# Patient Record
Sex: MALE | Race: WHITE | URBAN - METROPOLITAN AREA
[De-identification: names, ages, dates, MRNs, and addresses within clinical notes are randomized per-mention and may not be internally consistent; named-entity substitution may affect disease eponyms.]

---

## 2021-03-30 ENCOUNTER — OFFICE VISIT (OUTPATIENT)
Dept: URBAN - METROPOLITAN AREA CLINIC 50 | Facility: CLINIC | Age: 81
End: 2021-03-30

## 2021-04-08 ENCOUNTER — WEB ENCOUNTER (OUTPATIENT)
Dept: URBAN - METROPOLITAN AREA CLINIC 50 | Facility: CLINIC | Age: 81
End: 2021-04-08

## 2021-04-08 ENCOUNTER — OFFICE VISIT (OUTPATIENT)
Dept: URBAN - METROPOLITAN AREA CLINIC 50 | Facility: CLINIC | Age: 81
End: 2021-04-08
Payer: MEDICARE

## 2021-04-08 VITALS
DIASTOLIC BLOOD PRESSURE: 82 MMHG | BODY MASS INDEX: 34.56 KG/M2 | SYSTOLIC BLOOD PRESSURE: 182 MMHG | TEMPERATURE: 96.5 F | HEART RATE: 65 BPM | WEIGHT: 228 LBS | HEIGHT: 68 IN

## 2021-04-08 DIAGNOSIS — D12.6 TUBULAR ADENOMA OF COLON: ICD-10-CM

## 2021-04-08 DIAGNOSIS — Z86.010 PERSONAL HISTORY OF COLONIC POLYPS: ICD-10-CM

## 2021-04-08 DIAGNOSIS — K21.00 GASTROESOPHAGEAL REFLUX DISEASE WITH ESOPHAGITIS WITHOUT HEMORRHAGE: ICD-10-CM

## 2021-04-08 DIAGNOSIS — D64.89 OTHER SPECIFIED ANEMIAS: ICD-10-CM

## 2021-04-08 PROCEDURE — 99204 OFFICE O/P NEW MOD 45 MIN: CPT | Performed by: INTERNAL MEDICINE

## 2021-04-08 PROCEDURE — 99244 OFF/OP CNSLTJ NEW/EST MOD 40: CPT | Performed by: INTERNAL MEDICINE

## 2021-04-08 RX ORDER — FERROUS SULFATE TAB EC 325 MG (65 MG FE EQUIVALENT) 325 (65 FE) MG
1 TABLET TABLET DELAYED RESPONSE ORAL BID
Qty: 180 TABLET | Refills: 1 | OUTPATIENT

## 2021-04-08 RX ORDER — SODIUM, POTASSIUM,MAG SULFATES 17.5-3.13G
177 ML SOLUTION, RECONSTITUTED, ORAL ORAL AS DIRECTED
Qty: 1 BOX | Refills: 0 | OUTPATIENT
Start: 2021-04-08 | End: 2021-04-09

## 2021-04-08 NOTE — HPI-TODAY'S VISIT:
The patient was referred by Dr. Becker for anemia.   A copy of this document is being forwarded to the referring provider.  81 y.o. WM Beg in July, went to give blood, and, told couldn't b/c of anemia Then, again, in January, anemic too Went to PCP - told was anemic - sent here No blood in stool No abd pain No N/V Stamina isn't good - can't do anything for extended time - can't carry mulch for a long time; will have to rest in between

## 2021-04-22 PROBLEM — 428283002: Status: ACTIVE | Noted: 2021-04-08

## 2021-05-21 ENCOUNTER — OFFICE VISIT (OUTPATIENT)
Dept: URBAN - METROPOLITAN AREA MEDICAL CENTER 28 | Facility: MEDICAL CENTER | Age: 81
End: 2021-05-21
Payer: MEDICARE

## 2021-05-21 ENCOUNTER — DASHBOARD ENCOUNTERS (OUTPATIENT)
Age: 81
End: 2021-05-21

## 2021-05-21 ENCOUNTER — LAB OUTSIDE AN ENCOUNTER (OUTPATIENT)
Dept: URBAN - METROPOLITAN AREA CLINIC 96 | Facility: CLINIC | Age: 81
End: 2021-05-21

## 2021-05-21 DIAGNOSIS — K29.60 ADENOPAPILLOMATOSIS GASTRICA: ICD-10-CM

## 2021-05-21 DIAGNOSIS — D12.3 ADENOMA OF TRANSVERSE COLON: ICD-10-CM

## 2021-05-21 DIAGNOSIS — K31.89 ACQUIRED DEFORMITY OF DUODENUM: ICD-10-CM

## 2021-05-21 DIAGNOSIS — D50.9 ANEMIA, IRON DEFICIENCY: ICD-10-CM

## 2021-05-21 DIAGNOSIS — K63.89 BACTERIAL OVERGROWTH SYNDROME: ICD-10-CM

## 2021-05-21 DIAGNOSIS — K20.80 ESOPHAGITIS, LOS ANGELES GRADE A: ICD-10-CM

## 2021-05-21 LAB
ABSOLUTE NRBC COUNT: 0
AG RATIO: 1
ALBUMIN LEVEL: 3.8
ALK PHOS: 53
ALT: 22
ANION GAP: 13
AST: 35
BILIRUBIN TOTAL: 0.5
BUN/CREAT RATIO: 11
BUN: 34
CALCIUM LEVEL: 9.6
CHLORIDE LEVEL: 106
CO2 LEVEL: 21
CREATININE LEVEL: 3.1
FERRITIN LEVEL: 28.2
FOLATE LEVEL: 14.9
GFR AFRICAN AMERICAN: 24
GFR NON AFRICAN AMERICAN: 20
GLUCOSE LEVEL: 95
GLUCOSE POC: 95
HCT: 36
HGB: 11.3
IRON LEVEL: 52
IRON SAT: 17
MCH: 27.8
MCHC: 31.4
MCV: 88.5
MPV: 11.7
NRBC AUTO: 0
OSMO (CALC): 287
PERFORMING LAB: (no result)
PLATELETS: 164
POTASSIUM LEVEL: 4.5
PROTEIN TOTAL: 6.5
RBC: 4.07
RDW: 17.8
SODIUM LEVEL: 140
TIBC: 302
VITAMIN B12 LEVEL: 1694
WBC: 5.8

## 2021-05-21 PROCEDURE — 45380 COLONOSCOPY AND BIOPSY: CPT | Performed by: INTERNAL MEDICINE

## 2021-05-21 PROCEDURE — 43239 EGD BIOPSY SINGLE/MULTIPLE: CPT | Performed by: INTERNAL MEDICINE

## 2021-05-21 RX ORDER — FERROUS SULFATE TAB EC 325 MG (65 MG FE EQUIVALENT) 325 (65 FE) MG
1 TABLET TABLET DELAYED RESPONSE ORAL BID
Qty: 180 TABLET | Refills: 1 | Status: ACTIVE | COMMUNITY